# Patient Record
Sex: FEMALE | Race: WHITE
[De-identification: names, ages, dates, MRNs, and addresses within clinical notes are randomized per-mention and may not be internally consistent; named-entity substitution may affect disease eponyms.]

---

## 2019-06-27 ENCOUNTER — HOSPITAL ENCOUNTER (EMERGENCY)
Dept: HOSPITAL 63 - ER | Age: 44
Discharge: HOME | End: 2019-06-27
Payer: COMMERCIAL

## 2019-06-27 VITALS — SYSTOLIC BLOOD PRESSURE: 154 MMHG | DIASTOLIC BLOOD PRESSURE: 91 MMHG

## 2019-06-27 VITALS — HEIGHT: 65 IN | BODY MASS INDEX: 38.32 KG/M2 | WEIGHT: 230 LBS
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
## 2019-06-27 DIAGNOSIS — Z88.5: ICD-10-CM

## 2019-06-27 DIAGNOSIS — S63.655A: Primary | ICD-10-CM

## 2019-06-27 PROCEDURE — 99284 EMERGENCY DEPT VISIT MOD MDM: CPT

## 2019-06-27 PROCEDURE — 73130 X-RAY EXAM OF HAND: CPT

## 2019-06-27 NOTE — PHYS DOC
Past History


Past Medical History:  No Pertinent History


Past Surgical History:  Cholecystectomy, Other


Alcohol Use:  None


Drug Use:  None





Adult General


Chief Complaint


Chief Complaint:  HAND PROBLEM





HPI


HPI


44-year-old female presents with sudden onset left hand pain. The patient works 

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 She uses her hands all day long. She would've some 

folds and moves things 10 hours today. Yesterday, the patient was pulling on a 

product when she heard a pop in her left hand. This was followed by pain at the 

base of the left fourth digit. She was able to tolerate the pain and keep 

working. Today, the patient states the pain feels worse and she is unable to 

fully close her hand due to discomfort. The pain is centered at the base of the 

fourth digit and radiates up to the distal interphalangeal joint. Her some 

discomfort at the base of the third digit, but much less intense. The patient 

took 2 Aleve this morning. She denies any other injuries. She has no previous 

history of injuries to this hand. She denies fever or chills.





Review of Systems


Review of Systems





Constitutional: Denies fever or chills []


Eyes: Denies change in visual acuity, redness, or eye pain []


HENT: Denies nasal congestion or sore throat []


Respiratory: Denies cough or shortness of breath []


Cardiovascular: No additional information not addressed in HPI []


GI: Denies abdominal pain, nausea, vomiting, bloody stools or diarrhea []


: Denies dysuria or hematuria []


Musculoskeletal: Left hand pain[]


Integument: Denies rash or skin lesions []


Neurologic: Denies headache, focal weakness or sensory changes []


Endocrine: Denies polyuria or polydipsia []





All other systems were reviewed and found to be within normal limits, except as 

documented in this note.





Allergies


Allergies





Allergies








Coded Allergies Type Severity Reaction Last Updated Verified


 


  codeine Allergy Unknown  6/27/19 Yes











Physical Exam


Physical Exam





Constitutional: Well developed, well nourished, no acute distress, non-toxic 

appearance. []


HENT: Normocephalic, atraumatic, bilateral external ears normal, oropharynx 

moist, no oral exudates, nose normal. []


Eyes: PERRLA, EOMI, conjunctiva normal, no discharge. [] 


Neck: Normal range of motion, no tenderness, supple, no stridor. [] 


Cardiovascular:Heart rate regular rhythm, no murmur []


Lungs & Thorax:  Bilateral breath sounds clear to auscultation []


Abdomen: Bowel sounds normal, soft, no tenderness, no masses, no pulsatile 

masses. [] 


Skin: Warm, dry, no erythema, no rash. [] 


Back: No tenderness, no CVA tenderness. [] 


Extremities: Neurovascular and tendons of the left hand are intact. There is 

pain with flexion of the left digit. Pain with palpation of the 

metacarpophalangeal joint of the fourth digit, right hand. No significant 

swelling or obvious deformity.[] 


Neurologic: Alert and oriented X 3, normal motor function, normal sensory 

function, no focal deficits noted. []


Psychologic: Affect normal, judgement normal, mood normal. []





Current Patient Data


Vital Signs





                                   Vital Signs








  Date Time  Temp Pulse Resp B/P (MAP) Pulse Ox O2 Delivery O2 Flow Rate FiO2


 


6/27/19 08:59 98.9 104 18  99 Room Air  











EKG


EKG


[]





Radiology/Procedures


Radiology/Procedures


[]


Impressions:


Examination: HAND LEFT 3V


 


History: Medial hand pain and swelling.


 


Comparison/Correlation: None


 


Findings: Total 3 images of the left hand were obtained. Joint spaces are 


normal. No acute fracture or bony destruction. Soft tissues are 


unremarkable. No degenerative change.


 


 


Impression:


No acute process.


 


Electronically signed by: Becky Lemus MD (6/27/2019 9:48 AM) [id number]














DICTATED AND SIGNED BY:     BECKY LEMUS MD


DATE:     06/27/19 0948





CC: NANCY TEMPLETON DO; REYMUNDO COVARRUBIAS MD ~





Course & Med Decision Making


Course & Med Decision Making


Pertinent Labs and Imaging studies reviewed. (See chart for details)


The patient's hand x-rays negative for fracture. The patient has a hand sprain. 

I have advised anti-inflammatory, ice, and rest. I will give the patient a 

couple days off. She will follow up with the Worker's Comp. physician for 

further follow-up. She is stable for discharge at this time.


[]





Dragon Disclaimer


Dragon Disclaimer


This electronic medical record was generated, in whole or in part, using a voice

 recognition dictation system.





Departure


Departure:


Impression:  


   Primary Impression:  


   Sprain, MCP, hand, left


Disposition:  01 HOME, SELF-CARE


Condition:  STABLE


Referrals:  


REYMUNDO COVARRUBIAS MD (PCP)


Patient Instructions:  Finger Sprain, Easy-to-Read





Problem Qualifiers








   Primary Impression:  


   Sprain, MCP, hand, left


   Encounter type:  initial encounter  Finger:  ring finger  Qualified Codes:  

   S63.655A - Sprain of metacarpophalangeal joint of left ring finger, initial 

   encounter








NANCY TEMPLETON DO                 Jun 27, 2019 09:26

## 2019-06-27 NOTE — RAD
Examination: HAND LEFT 3V

 

History: Medial hand pain and swelling.

 

Comparison/Correlation: None

 

Findings: Total 3 images of the left hand were obtained. Joint spaces are 

normal. No acute fracture or bony destruction. Soft tissues are 

unremarkable. No degenerative change.

 

 

Impression:

No acute process.

 

Electronically signed by: Mauri Traylor MD (6/27/2019 9:48 AM) [id number]

## 2020-06-11 ENCOUNTER — HOSPITAL ENCOUNTER (EMERGENCY)
Dept: HOSPITAL 63 - ER | Age: 45
Discharge: ANOTHER HOSPITAL | End: 2020-06-11
Payer: SELF-PAY

## 2020-06-11 ENCOUNTER — HOSPITAL ENCOUNTER (INPATIENT)
Dept: HOSPITAL 61 - 2 SOUTH | Age: 45
LOS: 1 days | Discharge: HOME | DRG: 309 | End: 2020-06-12
Attending: INTERNAL MEDICINE | Admitting: INTERNAL MEDICINE
Payer: SELF-PAY

## 2020-06-11 VITALS — BODY MASS INDEX: 42.94 KG/M2 | WEIGHT: 257.72 LBS | HEIGHT: 65 IN

## 2020-06-11 VITALS — HEIGHT: 66 IN | BODY MASS INDEX: 35.43 KG/M2 | WEIGHT: 220.46 LBS

## 2020-06-11 VITALS — SYSTOLIC BLOOD PRESSURE: 107 MMHG | DIASTOLIC BLOOD PRESSURE: 48 MMHG

## 2020-06-11 DIAGNOSIS — Z79.899: ICD-10-CM

## 2020-06-11 DIAGNOSIS — I10: ICD-10-CM

## 2020-06-11 DIAGNOSIS — Z71.6: ICD-10-CM

## 2020-06-11 DIAGNOSIS — Z98.891: ICD-10-CM

## 2020-06-11 DIAGNOSIS — E66.01: ICD-10-CM

## 2020-06-11 DIAGNOSIS — Z71.3: ICD-10-CM

## 2020-06-11 DIAGNOSIS — I48.91: ICD-10-CM

## 2020-06-11 DIAGNOSIS — Z71.41: ICD-10-CM

## 2020-06-11 DIAGNOSIS — I47.1: Primary | ICD-10-CM

## 2020-06-11 DIAGNOSIS — Z88.5: ICD-10-CM

## 2020-06-11 DIAGNOSIS — Z82.49: ICD-10-CM

## 2020-06-11 DIAGNOSIS — Z72.89: ICD-10-CM

## 2020-06-11 DIAGNOSIS — Z20.828: ICD-10-CM

## 2020-06-11 DIAGNOSIS — F17.210: ICD-10-CM

## 2020-06-11 DIAGNOSIS — I45.6: ICD-10-CM

## 2020-06-11 LAB
ALBUMIN SERPL-MCNC: 3.8 G/DL (ref 3.4–5)
ALBUMIN/GLOB SERPL: 1 {RATIO} (ref 1–1.7)
ALP SERPL-CCNC: 94 U/L (ref 46–116)
ALT SERPL-CCNC: 53 U/L (ref 14–59)
AMPHETAMINE/METHAMPHETAMINE: (no result)
ANION GAP SERPL CALC-SCNC: 8 MMOL/L (ref 6–14)
APTT PPP: YELLOW S
AST SERPL-CCNC: 45 U/L (ref 15–37)
BACTERIA #/AREA URNS HPF: (no result) /HPF
BARBITURATES UR-MCNC: (no result) UG/ML
BASOPHILS # BLD AUTO: 0.1 X10^3/UL (ref 0–0.2)
BASOPHILS NFR BLD: 1 % (ref 0–3)
BENZODIAZ UR-MCNC: (no result) UG/L
BILIRUB SERPL-MCNC: 0.2 MG/DL (ref 0.2–1)
BILIRUB UR QL STRIP: (no result)
BUN/CREAT SERPL: 9 (ref 6–20)
CA-I SERPL ISE-MCNC: 6 MG/DL (ref 7–20)
CALCIUM SERPL-MCNC: 9 MG/DL (ref 8.5–10.1)
CANNABINOIDS UR-MCNC: (no result) UG/L
CHLORIDE SERPL-SCNC: 107 MMOL/L (ref 98–107)
CO2 SERPL-SCNC: 31 MMOL/L (ref 21–32)
COCAINE UR-MCNC: (no result) NG/ML
CREAT SERPL-MCNC: 0.7 MG/DL (ref 0.6–1)
EOSINOPHIL NFR BLD: 0.3 X10^3/UL (ref 0–0.7)
EOSINOPHIL NFR BLD: 3 % (ref 0–3)
ERYTHROCYTE [DISTWIDTH] IN BLOOD BY AUTOMATED COUNT: 14.6 % (ref 11.5–14.5)
FIBRINOGEN PPP-MCNC: CLEAR MG/DL
GFR SERPLBLD BASED ON 1.73 SQ M-ARVRAT: 91.3 ML/MIN
GLOBULIN SER-MCNC: 3.8 G/DL (ref 2.2–3.8)
GLUCOSE SERPL-MCNC: 112 MG/DL (ref 70–99)
GLUCOSE UR STRIP-MCNC: (no result) MG/DL
HCT VFR BLD CALC: 45.6 % (ref 36–47)
HGB BLD-MCNC: 14.8 G/DL (ref 12–15.5)
LYMPHOCYTES # BLD: 3.6 X10^3/UL (ref 1–4.8)
LYMPHOCYTES NFR BLD AUTO: 35 % (ref 24–48)
MCH RBC QN AUTO: 30 PG (ref 25–35)
MCHC RBC AUTO-ENTMCNC: 33 G/DL (ref 31–37)
MCV RBC AUTO: 94 FL (ref 79–100)
METHADONE SERPL-MCNC: (no result) NG/ML
MONO #: 0.8 X10^3/UL (ref 0–1.1)
MONOCYTES NFR BLD: 8 % (ref 0–9)
NEUT #: 5.5 X10^3UL (ref 1.8–7.7)
NEUTROPHILS NFR BLD AUTO: 54 % (ref 31–73)
NITRITE UR QL STRIP: (no result)
OPIATES UR-MCNC: (no result) NG/ML
PCP SERPL-MCNC: (no result) MG/DL
PLATELET # BLD AUTO: 283 X10^3/UL (ref 140–400)
POTASSIUM SERPL-SCNC: 3.9 MMOL/L (ref 3.5–5.1)
PROT SERPL-MCNC: 7.6 G/DL (ref 6.4–8.2)
RBC # BLD AUTO: 4.87 X10^6/UL (ref 3.5–5.4)
RBC #/AREA URNS HPF: (no result) /HPF (ref 0–2)
SODIUM SERPL-SCNC: 146 MMOL/L (ref 136–145)
SP GR UR STRIP: <=1.005
SQUAMOUS #/AREA URNS LPF: (no result) /LPF
UROBILINOGEN UR-MCNC: 0.2 MG/DL
WBC # BLD AUTO: 10.3 X10^3/UL (ref 4–11)
WBC #/AREA URNS HPF: (no result) /HPF (ref 0–4)

## 2020-06-11 PROCEDURE — 84484 ASSAY OF TROPONIN QUANT: CPT

## 2020-06-11 PROCEDURE — 85025 COMPLETE CBC W/AUTO DIFF WBC: CPT

## 2020-06-11 PROCEDURE — 87086 URINE CULTURE/COLONY COUNT: CPT

## 2020-06-11 PROCEDURE — 96375 TX/PRO/DX INJ NEW DRUG ADDON: CPT

## 2020-06-11 PROCEDURE — 71045 X-RAY EXAM CHEST 1 VIEW: CPT

## 2020-06-11 PROCEDURE — 96365 THER/PROPH/DIAG IV INF INIT: CPT

## 2020-06-11 PROCEDURE — 81001 URINALYSIS AUTO W/SCOPE: CPT

## 2020-06-11 PROCEDURE — 93306 TTE W/DOPPLER COMPLETE: CPT

## 2020-06-11 PROCEDURE — 80053 COMPREHEN METABOLIC PANEL: CPT

## 2020-06-11 PROCEDURE — 36415 COLL VENOUS BLD VENIPUNCTURE: CPT

## 2020-06-11 PROCEDURE — 80307 DRUG TEST PRSMV CHEM ANLYZR: CPT

## 2020-06-11 PROCEDURE — 96366 THER/PROPH/DIAG IV INF ADDON: CPT

## 2020-06-11 PROCEDURE — 99285 EMERGENCY DEPT VISIT HI MDM: CPT

## 2020-06-11 PROCEDURE — 93005 ELECTROCARDIOGRAM TRACING: CPT

## 2020-06-11 PROCEDURE — G0378 HOSPITAL OBSERVATION PER HR: HCPCS

## 2020-06-11 PROCEDURE — 96376 TX/PRO/DX INJ SAME DRUG ADON: CPT

## 2020-06-11 RX ADMIN — ADENOSINE ONE MG: 3 INJECTION, SOLUTION INTRAVENOUS at 20:45

## 2020-06-11 RX ADMIN — ADENOSINE ONE MG: 3 INJECTION, SOLUTION INTRAVENOUS at 20:43

## 2020-06-11 NOTE — RAD
Study: CR CHEST AP ONLY

 

Indication: Shortness of air. Tachycardia.

 

Comparison: None.

 

Findings:

 

Low lung volumes with bronchovascular crowding. The cardiomediastinal 

silhouette and alberta are within normal limits for size given low lung 

volumes and AP technique. No lobar consolidation, pleural effusion or 

pneumothorax.

 

Impression:

 

No acute radiographic abnormality of the chest. Low lung volumes.

 

Electronically signed by: SANJAY HUERTAS MD (6/11/2020 8:57 PM) [id number]

## 2020-06-11 NOTE — PHYS DOC
Past History


Past Medical History:  No Pertinent History


Past Medical History


WPW


Past Surgical History:  Cholecystectomy, Other


Smoking:  Non-smoker


Alcohol Use:  None


Drug Use:  None





General Adult


EDM:


Chief Complaint:  RAPID HEART RATE





HPI:


HPI:





Patient is a 45 year old female who presents for evaluation of a rapid heart 

rate that started about 1 1/2 hours ago.  Patient has a history of prior supra 

ventricular tachycardia as well as Darci-Parkinson-White syndrome.  Patient has 

had a prior cardiac ablation in the past.  Patient is normally seen at [facility] for Cardiology.  Patient has anxiety, sweats and shortness of air on 

arrival.  Patient states this feels very similar to prior episodes of SVT in the

past.  Patient is otherwise stable with no other complaints





Review of Systems:


Review of Systems:


Constitutional:  Denies fever or chills 


Eyes:  Denies change in visual acuity 


HENT:  Denies nasal congestion or sore throat 


Respiratory:  Denies cough has shortness of breath 


Cardiovascular:  Has chest pain/pressure, no edema, has palpitations


GI:  Denies abdominal pain, nausea, vomiting, bloody stools or diarrhea 


: Denies dysuria 


Musculoskeletal:  Denies back pain or joint pain 


Integument:  Denies rash 


Neurologic:  Denies headache, focal weakness or sensory changes 


Endocrine:  Denies polyuria or polydipsia 


Lymphatic:  Denies swollen glands 


Psychiatric:  Denies depression is moderately anxiety





Heart Score:


Risk Factors:


Risk Factors:  DM, Current or recent (<one month) smoker, HTN, HLP, family 

history of CAD, obesity.


Risk Scores:


Score 0 - 3:  2.5% MACE over next 6 weeks - Discharge Home


Score 4 - 6:  20.3% MACE over next 6 weeks - Admit for Clinical Observation


Score 7 - 10:  72.7% MACE over next 6 weeks - Early Invasive Strategies





Allergies:


Allergies:





Allergies








Coded Allergies Type Severity Reaction Last Updated Verified


 


  codeine Allergy Unknown  19 Yes











Physical Exam:


PE:





Constitutional: Well developed, well nourished, moderate distress, non-toxic 

appearance. []


HENT: Normocephalic, atraumatic, bilateral external ears normal, oropharynx 

moist, no oral exudates, nose normal. []


Eyes: PERRL, EOMI, conjunctiva normal, no discharge. [] 


Neck: Normal range of motion, no tenderness, supple, no stridor. [] 


Cardiovascular: tachy heart rate, regular rhythm, no murmur []


Lungs & Thorax:  Bilateral breath sounds clear to auscultation []


Abdomen: Bowel sounds normal, soft, no tenderness, no masses, no pulsatile 

masses. [] 


Skin: Warm, dry, no erythema, no rash. [] 


Back: No tenderness. [] 


Extremities: No tenderness, no cyanosis, no clubbing, ROM intact, no edema. [] 


Neurologic: Alert and oriented X 3, normal motor function, normal sensory 

function, no focal deficits noted. []


Psychologic: Affect normal, judgement normal, moderately anxious []





Current Patient Data:


Labs:





Laboratory Tests








Test


 20


20:30


 


White Blood Count 10.3 x10^3/uL 


 


Red Blood Count 4.87 x10^6/uL 


 


Hemoglobin 14.8 g/dL 


 


Hematocrit 45.6 % 


 


Mean Corpuscular Volume 94 fL 


 


Mean Corpuscular Hemoglobin 30 pg 


 


Mean Corpuscular Hemoglobin


Concent 33 g/dL 





 


Red Cell Distribution Width 14.6 % 


 


Platelet Count 283 x10^3/uL 


 


Neutrophils (%) (Auto) 54 % 


 


Lymphocytes (%) (Auto) 35 % 


 


Monocytes (%) (Auto) 8 % 


 


Eosinophils (%) (Auto) 3 % 


 


Basophils (%) (Auto) 1 % 


 


Neutrophils # (Auto) 5.5 x10^3uL 


 


Lymphocytes # (Auto) 3.6 x10^3/uL 


 


Monocytes # (Auto) 0.8 x10^3/uL 


 


Eosinophils # (Auto) 0.3 x10^3/uL 


 


Basophils # (Auto) 0.1 x10^3/uL 


 


Sodium Level 146 mmol/L 


 


Potassium Level 3.9 mmol/L 


 


Chloride Level 107 mmol/L 


 


Carbon Dioxide Level 31 mmol/L 


 


Anion Gap 8 


 


Blood Urea Nitrogen 6 mg/dL 


 


Creatinine 0.7 mg/dL 


 


Estimated GFR


(Cockcroft-Gault) 91.3 





 


BUN/Creatinine Ratio 9 


 


Glucose Level 112 mg/dL 


 


Calcium Level 9.0 mg/dL 


 


Total Bilirubin 0.2 mg/dL 


 


Aspartate Amino Transf


(AST/SGOT) 45 U/L 





 


Alanine Aminotransferase


(ALT/SGPT) 53 U/L 





 


Alkaline Phosphatase 94 U/L 


 


Troponin I Quantitative < 0.017 ng/mL 


 


Total Protein 7.6 g/dL 


 


Albumin 3.8 g/dL 


 


Albumin/Globulin Ratio 1.0 








Current Medications








 Medications


  (Trade)  Dose


 Ordered  Sig/Willian


 Route


 PRN Reason  Start Time


 Stop Time Status Last Admin


Dose Admin


 


 Sodium Chloride  1,000 ml @ 


 1,000 mls/hr  1X  ONCE


 IV


   20 20:30


 20 21:29   





 


 Adenosine


  (Adenocard)  6 mg  1X  ONCE


 IV


   20 20:30


 20 20:41 DC  





 


 Diltiazem HCl 125


 mg/Sodium Chloride  125 ml @ 5


 mls/hr  CONT  PRN


 IV


 SEE I/O RECORD  20 21:00


   UNV  














EKG:


EKG:


EKG read at  1 PM showed SVT rate 160s otherwise no other obvious acute 

changes, incomplete bundle branch block, abnormal EKG []





Radiology/Procedures:


Radiology/Procedures:


[facility] [address], [place] [address]


                                 [phone]


                                        


                                 IMAGING REPORT





                                     Signed





PATIENT: ALEXANDRE GUERRA ACCOUNT: [id number]     MRN#: [id number]


: 1975           LOCATION: ER              AGE: 45


SEX: F                    EXAM DT: 20         ACCESSION#: [id number]


STATUS: REG ER            ORD. PHYSICIAN: PAULA POTTER DO


REASON: short of air, tachycardia


PROCEDURE: CHEST AP ONLY





Study: CR CHEST AP ONLY


 


Indication: Shortness of air. Tachycardia.


 


Comparison: None.


 


Findings:


 


Low lung volumes with bronchovascular crowding. The cardiomediastinal 


silhouette and alberta are within normal limits for size given low lung 


volumes and AP technique. No lobar consolidation, pleural effusion or 


pneumothorax.


 


Impression:


 


No acute radiographic abnormality of the chest. Low lung volumes.


 


Electronically signed by: SANJAY HUERTAS MD (2020 8:57 PM) [id number]














DICTATED AND SIGNED BY:     SANJAY HUERTAS MD


DATE:     20





CC: PCP,NO; PAULA POTTER DO ~


[]





Course & Med Decision Making:


Course & Med Decision Making


Pertinent Labs and Imaging studies reviewed. (See chart for details)





[]





Dragon Disclaimer:


Dragon Disclaimer:


This electronic medical record was generated, in whole or in part, using a voice

 recognition dictation system.





 dose of adenosine 6 mg briefly converted heart to a sinus rhythm but it did

 not stay more than a minute.  Patient was then given adenosine 12 mg IV push 

again the rhythm changed for less than 1 minute then converted back to SVT.  

Repeat EKG being ordered at this time





  Case was discussed with Dr. Atkinson for Cardiology.  He requested patient 

be placed on a Cardizem drip.  Furthermore he would like patient to be 

transferred to [facility] where he would check on patient later 

tonight.  In the meantime we will call the on-call hospitalist service for 

acceptance and to get a bed.  Patient highly anxious so dose of Ativan ordered





 Dr. Caballero called back and accepted patient for admission to [facility].  He will get cardiology consult.





Departure


Departure:


Impression:  


   Primary Impression:  


   SVT (supraventricular tachycardia)


   Additional Impression:  


   History of Darci-Parkinson-White (WPW) syndrome


Disposition:   TRANSFER OTHER


Admitting Physician:  Other (Dr. Caballero)


Condition:  STABLE


Referrals:  


PCP,NO (PCP)





Justification of Admission:


Justification of Admission:


Justification of Admission Dx:  Yes


CHF:  Cardiac Arrhythmias





Critical Care Time


 Critical care time was 30 minutes exclusive of procedures.  This included 

talking to admitting hospitalist and cardiologist as well as reviewing labs and 

x-rays





Additional Procedures


Additional Procedures :  


   Additional Procedures:  cardioversion/defib


Progress


With consent patient, 3 attempts at cardioversion with adenosine used.  This 

included 6 mg dose followed by 12 mg and then a repeat 12 mg dose over about 20 

minutes.  After each dose there was a brief conversion to a sinus rhythm but 

patient converted back to SVT less than 1 minute later each dose.  Blood 

pressure remained stable.  Patient remained lucid and appropriate.  Repeat EKG 

at  showed the patient had once again resumed supraventricular tachycardia.











PAULA POTTER DO              2020 20:27

## 2020-06-11 NOTE — EKG
[facility] [address], [place] [address]

Test Date:    2020               Test Time:    20:18:06

Pat Name:     ALEXANDRE GUERRA           Department:   

Patient ID:   [id number]           Room:          

Gender:       F                        Technician:   

:          1975               Requested By: PAULA POTTER

Order Number: [id number]            Reading MD:   Morro Sebastian

                                 Measurements

Intervals                              Axis          

Rate:         166                      P:            

IL:                                    QRS:          18

QRSD:         86                       T:            -68

QT:           282                                    

QTc:          470                                    

                           Interpretive Statements

SUPRAVENTRICULAR TACHYCARDIA

NONSPECIFIC ST-T WAVE CHANGES.



Electronically Signed On 2020 16:45:29 CDT by Morro Sebastian

## 2020-06-12 VITALS — SYSTOLIC BLOOD PRESSURE: 113 MMHG | DIASTOLIC BLOOD PRESSURE: 64 MMHG

## 2020-06-12 VITALS — DIASTOLIC BLOOD PRESSURE: 54 MMHG | SYSTOLIC BLOOD PRESSURE: 93 MMHG

## 2020-06-12 VITALS — SYSTOLIC BLOOD PRESSURE: 113 MMHG | DIASTOLIC BLOOD PRESSURE: 61 MMHG

## 2020-06-12 VITALS — SYSTOLIC BLOOD PRESSURE: 98 MMHG | DIASTOLIC BLOOD PRESSURE: 70 MMHG

## 2020-06-12 VITALS — SYSTOLIC BLOOD PRESSURE: 102 MMHG | DIASTOLIC BLOOD PRESSURE: 59 MMHG

## 2020-06-12 VITALS — DIASTOLIC BLOOD PRESSURE: 60 MMHG | SYSTOLIC BLOOD PRESSURE: 103 MMHG

## 2020-06-12 VITALS — SYSTOLIC BLOOD PRESSURE: 99 MMHG | DIASTOLIC BLOOD PRESSURE: 65 MMHG

## 2020-06-12 VITALS — SYSTOLIC BLOOD PRESSURE: 99 MMHG | DIASTOLIC BLOOD PRESSURE: 57 MMHG

## 2020-06-12 VITALS — SYSTOLIC BLOOD PRESSURE: 89 MMHG | DIASTOLIC BLOOD PRESSURE: 55 MMHG

## 2020-06-12 VITALS — DIASTOLIC BLOOD PRESSURE: 56 MMHG | SYSTOLIC BLOOD PRESSURE: 103 MMHG

## 2020-06-12 VITALS — DIASTOLIC BLOOD PRESSURE: 55 MMHG | SYSTOLIC BLOOD PRESSURE: 107 MMHG

## 2020-06-12 VITALS — DIASTOLIC BLOOD PRESSURE: 60 MMHG | SYSTOLIC BLOOD PRESSURE: 108 MMHG

## 2020-06-12 NOTE — PDOC3
Discharge Summary


Visit Information


Date of Admission:  Jun 11, 2020


Date of Discharge:  Jun 12, 2020


Admitting Diagnosis:  Afib with RVR


Final Diagnosis


Afib with RVR





Brief Hospital Course


Allergies





                                    Allergies








Coded Allergies Type Severity Reaction Last Updated Verified


 


  codeine Allergy Intermediate Rash 4/9/15 Yes








Vital Signs





Vital Signs








  Date Time  Temp Pulse Resp B/P (MAP) Pulse Ox O2 Delivery O2 Flow Rate FiO2


 


6/12/20 14:19 98.1 78 16 107/55 (72) 96 Room Air  





 98.1       








Brief Hospital Course


Ms Patel is a 46 yo F w/ PMHx smoker, WPW s/p ablation (6yrs ago) who 

presents to [facility] on 6/11/2020 for palpitations that started about 90 minutes

prior to ED arrival.  Patient has a history of prior supra ventricular 

tachycardia as well as Darci-Parkinson-White syndrome.  Patient has had a prior 

cardiac ablation in the past at [facility] and used to follow up there for 

Cardiology.


EKG showed SVT rate 160s otherwise no other obvious acute changes, incomplete 

bundle branch block, abnormal EKG. CXR no acute abnormalities


She was given a dose of adenosine 6 mg briefly converted heart to a sinus rhythm

but it did not stay more than a minute.  Patient was then given adenosine 12 mg 

IV push again the rhythm changed for less than 1 minute then converted back to 

SVT.


Cardiology was contacted, and they recommended to start a diltiazem infusion.  

She was transferred to [facility] in stable condition and converted to sinus 

rhythm around 0200.


WBC 10.3, Hb 14.8, platelets 283, Na 146, K 3.9, BUN 6, Cr 0.7, AST 45, ALT 53, 

Trop <0.017





Seen bedside [facility] she continues on diltiazem infusion.  She

still admits her history by saying that she has been tried on metoprolol 25 mg 

twice daily in the past and had minimal success and had continued SVT despite 

that and approximately 6 years ago she had an ablation at [facility] that was

successful and has not followed up for over a year.  She continues to smoke and 

drink, however she avoids other stimulants such as caffeine and cheese.  She 

notes that for the past 3 days she is actually felt badly and has a lot of 

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  She 

has never been tried on flecainide or Rythmol.





Patient is otherwise stable with no other complaints





Seen and given flecainide x1 dose, maintained NSR for 24 hours. Changed to 

metoprolol after cardiology consultation and continued on celexa. She will have 

outpatient cardiology f/u. Her uninsured status makes this difficult.





SVT (supraventricular tachycardia) -not surprising adenosine did not work, it is

good that calcium channel blocker worked.  Given her history of metoprolol gave 

1 dose of flecainide and will d/c on  mg, cardiology f/u, metoprolol 25mg

BID


History of Darci-Parkinson-White (WPW) syndrome -status post ablation. Ca

rdiology consulted outpatient


Smoker - counseled on cessation


ETOH use - counseled on cessation


Morbid obesitycounseled on weight loss





Greater than 135 minutes spent on same day admit and d/c





Discharge Information


Condition at Discharge:  Improved


Follow Up:  Weeks (1)


Disposition/Orders:  D/C to Home


Scheduled


Citalopram Hydrobromide (Celexa) 20 Mg Tablet, 1 TAB PO DAILY for MDD for 90 

Days, #90 Ref 3


   Prescribed by: CARLENE MAXWELL MD on 6/12/20 1651


Metoprolol Tartrate (Metoprolol Tartrate) 25 Mg Tablet, 1 TAB PO BID for 

Afib/WPW for 90 Days, #180 Ref 3


   Prescribed by: CARLENE MAXWELL MD on 6/12/20 1651


Omeprazole (Omeprazole) 20 Mg Capsule.dr, 1 CAP PO DAILY for   , #30 Ref 5 

(Reported)


   Entered as Reported by: Torie Dennison on 6/12/20 0023


   Last Action: Converted on 6/12/20 0141 by DEEPTI TIRADO RN





Justicifation of Admission Dx:


Justifications for Admission:


Justification of Admission Dx:  Yes











CARLENE MAXWELL MD        Jun 12, 2020 16:55

## 2020-06-12 NOTE — NUR
patient arrived to unit at approx 2350 on 6/11/20 accompanied by EMS. Pt transfer from [facility]. HR was 140's when patient arrived, assessment complete. Cardizem Gtt running at 15 
ml/hr. no complaints of pain at this time, pt is resting comfortably on RA. Orientated 
patient to unit, reminded to call before ambulating. Call light in reach, bed in low locked 
position. Will continue to monitor.

## 2020-06-12 NOTE — NUR
SS following for discharge planning. SS reviewed pt chart and discussed with pt RN. Pt is 
from home and is currently on room air. Currently awaiting cardiology to see. Possible 
discharge to home today. SS will continue to follow for discharge planning.

## 2020-06-12 NOTE — NUR
Discharge: Teaching verbal and written. Reviewed medication, follow up, SVT, ECHO, smoking 
cessation, ect. Patient verbalized understanding. Prescription for metoprolol sent to 
pharmacy. All belongings with patient. Patient assisted off of unit. Cab pass provided.

## 2020-06-12 NOTE — EKG
[facility]

              [address] [place] [address]

Test Date:    2020               Test Time:    16:17:46

Pat Name:     ALEXANDRE GUERRA           Department:   

Patient ID:   [id number]           Room:         252 1

Gender:       F                        Technician:   MARKEL

:          1975               Requested By: TONY MATHUR

Order Number: [id number]           Reading MD:   Tony Mathur MD

                                 Measurements

Intervals                              Axis          

Rate:         76                       P:            30

FL:           186                      QRS:          -1

QRSD:         96                       T:            -2

QT:           378                                    

QTc:          430                                    

                           Interpretive Statements

SINUS RHYTHM



Electronically Signed On 6- 13:16:25 CDT by Tony Mahtur MD

## 2020-06-12 NOTE — CARD
MR#: [id number]

Account#: [id number]

Accession#: [id number]

Date of Study: 06/12/2020

Ordering Physician: CARLENE MAXWELL, 

Referring Physician: CARLENE MAXWELL, 

Tech: Tanvi Armstrong





--------------- APPROVED REPORT --------------





EXAM: Two-dimensional and M-mode echocardiogram with Doppler and color Doppler.



Other Information 

Quality : AverageHR: 78bpm

Technically limited study due to  body habitus.



INDICATION

Atrial Fibrillation



2D DIMENSIONS 

RVDd2.4 (2.9-3.5cm)Left Atrium(2D)3.4 (1.6-4.0cm)

IVSd0.8 (0.7-1.1cm)Aortic Root(2D)2.6 (2.0-3.7cm)

LVDd4.7 (3.9-5.9cm)LVOT Diameter2.1 (1.8-2.4cm)

PWd1.0 (0.7-1.1cm)LVDs3.1 (2.5-4.0cm)

FS (%) 34.6 %SV64.7 ml

LVEF(%)63.7 (>50%)



Aortic Valve

AoV Peak Ketan.125.0cm/sAoV VTI24.8cm

AO Peak GR.6.3mmHgLVOT  VTI 20.38cm

AO Mean GR.4mmHg



Mitral Valve

MV E Yymlchxb62.3cm/sMV E Peak Gr.4mmHg

MV DECEL [id number] A Grpyzlga11.5cm/s

MV E Mean Gr.2mmHgE/A  Ratio1.5



TDI

Lateral E' P. V8.56cm/sMedial E' P. V11.71cm/s

E/Lateral E'9.6E/Medial E'7.0



Pulmonary Vein

S1 Ohjdjjul95.8cm/sS2 Sdqkfmhk44.87cm/s

D2 Ngknrvfq66.9cm/sPVa ukqraion995zvkk



 LEFT VENTRICLE 

The left ventricle is normal size. There is borderline concentric left ventricular hypertrophy. The l
eft ventricular systolic function is normal and the ejection fraction is within normal range. The Eje
ction Fraction is 50-55%. There is normal LV segmental wall motion. The left ventricular diastolic fu
nction and filling is normal for age.



 RIGHT VENTRICLE 

The right ventricle is normal size. There is normal right ventricular wall thickness. The right ventr
icular systolic function is normal.



 ATRIA 

The left atrium size is normal. The right atrium size is normal. The interatrial septum is intact wit
h no evidence for an atrial septal defect or patent foramen ovale as noted on 2-D or Doppler imaging.




 AORTIC VALVE 

The aortic valve is normal in structure and function. Doppler and Color Flow revealed no significant 
aortic regurgitation. There is no significant aortic valvular stenosis.



 MITRAL VALVE 

The mitral valve is normal in structure and function. There is no evidence of mitral valve prolapse. 
There is no mitral valve stenosis. Doppler and Color-flow revealed trace mitral regurgitation.



 TRICUSPID VALVE 

The tricuspid valve is normal in structure and function. Doppler and Color Flow revealed no tricuspid
 valve regurgitation noted. There is no tricuspid valve stenosis.



 PULMONIC VALVE 

The pulmonic valve is not well visualized. Doppler and Color Flow revealed no pulmonic valvular regur
gitation. There is no pulmonic valvular stenosis.



 GREAT VESSELS 

The aortic root is normal in size. The IVC is normal in size and collapses >50% with inspiration.



 PERICARDIAL EFFUSION 

There is no evidence of significant pericardial effusion.



Critical Notification

Critical Value: No



<Conclusion>

The left ventricular systolic function is normal and the ejection fraction is within normal range. Th
e Ejection Fraction is 50-55%.

There is normal LV segmental wall motion.



Signed by : Wilbert Kitchen, 

Electronically Approved : 06/12/2020 14:27:43

## 2020-06-12 NOTE — CONS
DATE OF CONSULTATION:  06/12/2020



REASON FOR CONSULTATION:  Arrhythmia.



CONSULTING PHYSICIAN:  Dr. Gold.



HISTORY OF PRESENT ILLNESS:  The patient is a very pleasant 45-year-old woman

who presented to the hospital in the setting of SVT.  She had been in her usual

state of health, and approximately a month or so ago, she had an episode of SVT,

which prompted evaluation at [facility] and her SVT resolved

with adenosine and she was discharged in stable condition.  She then had another

episode yesterday, which would not resolve with vagal maneuvers.  She has a

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 

She has not had followup with her cardiologist for approximately 6 years.  She

feels that triggers for her SVT include alcohol and stimulant use, which

occurred yesterday prior to her presentation.



PAST MEDICAL HISTORY:

1.  Obesity.

2.  SVT, status post ablation.



SOCIAL HISTORY:  The patient does not smoke.  She does drink alcohol and

occasionally uses energy drinks.



FAMILY HISTORY:  No sudden cardiac death.  No significant other family history

notable for cardiac pathology.



REVIEW OF SYSTEMS:  Negative for 10 out of 14 systems reviewed, unless otherwise

mentioned above in HPI.



ALLERGIES:  CODEINE.



CURRENT CARDIOVASCULAR MEDICATIONS:  None.



PHYSICAL EXAMINATION:

VITAL SIGNS:  Stable.

HEAD AND NECK:  Unremarkable.

CARDIAC:  Regular rate and rhythm without any murmurs, rubs or gallops.

LUNGS:  Clear to auscultation.

ABDOMEN:  Soft, nontender.

EXTREMITIES:  No clubbing, cyanosis or edema.

NEUROLOGIC:  No focal deficits.

MUSCULOSKELETAL:  No trauma.

SKIN:  No rashes.



DIAGNOSTIC STUDIES:  Hemoglobin, platelets, creatinine, myocardial enzymes are

within normal limits.



BNP is within normal limits.



Echocardiogram is unremarkable.



EKG demonstrates sinus rhythm without any acute ST or T-wave changes and no

evidence of accessory pathway.



Telemetry and EMS/ER, EKG was reviewed.  Overall, consistent with AVNRT.



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

stress, stimulant use and alcohol use.



RECOMMENDATIONS:  Start the patient on metoprolol 25 mg p.o. b.i.d. and she is

currently not on any rate control agents and then reassess on an outpatient

basis after cessation of heavy alcohol use and stimulant use.



Thank you for this consultation.  Please call with any further questions.

 



______________________________

TONY MATHUR MD



DR:  ESTRELLITA/delia  JOB#:  [id number] / [id number]

DD:  06/12/2020 23:34  DT:  06/12/2020 23:58

## 2021-11-26 ENCOUNTER — HOSPITAL ENCOUNTER (EMERGENCY)
Dept: HOSPITAL 63 - ER | Age: 46
Discharge: HOME | End: 2021-11-26
Payer: SELF-PAY

## 2021-11-26 VITALS — WEIGHT: 282.85 LBS | BODY MASS INDEX: 45.46 KG/M2 | HEIGHT: 66 IN

## 2021-11-26 VITALS — SYSTOLIC BLOOD PRESSURE: 136 MMHG | DIASTOLIC BLOOD PRESSURE: 80 MMHG

## 2021-11-26 DIAGNOSIS — Z88.5: ICD-10-CM

## 2021-11-26 DIAGNOSIS — J06.9: Primary | ICD-10-CM

## 2021-11-26 PROCEDURE — 99283 EMERGENCY DEPT VISIT LOW MDM: CPT

## 2021-11-26 PROCEDURE — 71045 X-RAY EXAM CHEST 1 VIEW: CPT

## 2021-11-26 NOTE — RAD
Study: XR CHEST 1V



Indication: Cough.



Comparison: 6/11/2020



Findings:



Unchanged cardiomediastinal silhouette and alberta. Similar aeration pattern of the lungs. No confluent 
infiltrate, pleural effusion or pneumothorax.



Impression:



No acute radiographic abnormality of the chest. No relevant change from the 6/11/2020 comparison. 



Electronically signed by: SANJAY HUERTAS MD (11/26/2021 7:43 AM) [id number]

## 2021-11-26 NOTE — PHYS DOC
Past History


Past Medical History:  Other


Additional Past Medical Histor:  wpw


Past Surgical History:  Cholecystectomy, Tubal ligation, Other


Additional Past Surgical Histo:  Ablasion


Smoking:  Non-smoker


Alcohol Use:  Occasionally


Drug Use:  None





General Adult


EDM:


Chief Complaint:  COUGH





HPI:


HPI:


46-year-old female presents with cough.  The patient has had a cough for about 2

weeks.  It is gotten worse the last couple of days.  She also has a hoarse voice

and sore throat.  She was seen by her primary provider and had a round of 

antibiotics and steroids previously.  This did not make her any better.  Today 

she has some chest wall pain due to coughing.  It is tender to palpation.  She 

denies any falls or trauma.  No fever at home.  She is vaccinated against COVID-

19 and is tested at work regularly.





Review of Systems:


Review of Systems:


Constitutional:  Denies fever or chills 


Eyes:  Denies change in visual acuity 


HENT:  Denies nasal congestion or sore throat 


Respiratory: Cough without shortness of breath 


Cardiovascular:  Denies chest pain or edema 


GI:  Denies abdominal pain, nausea, vomiting, bloody stools or diarrhea 


: Denies dysuria 


Musculoskeletal:  Denies back pain or joint pain 


Integument:  Denies rash 


Neurologic:  Denies headache, focal weakness or sensory changes 


Endocrine:  Denies polyuria or polydipsia 


Lymphatic:  Denies swollen glands 


Psychiatric:  Denies depression or anxiety





Allergies:


Allergies:





Allergies








Coded Allergies Type Severity Reaction Last Updated Verified


 


  codeine Allergy Unknown  6/27/19 Yes











Physical Exam:


PE:





Constitutional: Well developed, well nourished, morbidly obese, no acute 

distress, non-toxic appearance. []


HENT: Normocephalic, atraumatic, bilateral external ears normal, oropharynx 

moist, no oral exudates, nose normal. []


Eyes: PERRLA, EOMI, conjunctiva normal, no discharge. [] 


Neck: Normal range of motion, no tenderness, supple, no stridor. [] 


Cardiovascular: Heart rate regular rhythm, no murmur []


Lungs & Thorax:  Bilateral breath sounds clear to auscultation.  Mild left chest

 wall tenderness with palpation.  []


Abdomen: Bowel sounds normal, soft, no tenderness, no masses, no pulsatile 

masses. [] 


Skin: Warm, dry, no erythema, no rash. [] 


Back: No tenderness, no CVA tenderness. [] 


Extremities: No tenderness, no cyanosis, no clubbing, ROM intact, no edema. [] 


Neurologic: Alert and oriented X 3, normal motor function, normal sensory 

function, no focal deficits noted. []


Psychologic: Affect normal, judgement normal, mood normal. []





Current Patient Data:


Vital Signs:





                                   Vital Signs








  Date Time  Temp Pulse Resp B/P (MAP) Pulse Ox O2 Delivery O2 Flow Rate FiO2


 


11/26/21 07:01 96.3 70 18 136/80 (98) 98 Room Air  











EKG:


EKG:


[]





Radiology/Procedures:


Radiology/Procedures:


[]


Impressions:


Study: XR CHEST 1V





Indication: Cough.





Comparison: 6/11/2020





Findings:





Unchanged cardiomediastinal silhouette and alberta. Similar aeration pattern of the

 lungs. No confluent infiltrate, pleural effusion or pneumothorax.





Impression:





No acute radiographic abnormality of the chest. No relevant change from the 

6/11/2020 comparison. 





Electronically signed by: SANJAY HUERTAS MD (11/26/2021 7:43 AM) [id number]














DICTATED AND SIGNED BY:     SANJAY HUERTAS MD


DATE:     11/26/21 0741





CC: NANCY TEMPLETON DO; FAY BLANCAS DO ~MTH0 0





Heart Score:


C/O Chest Pain:  N/A


Risk Factors:


Risk Factors:  DM, Current or recent (<one month) smoker, HTN, HLP, family 

history of CAD, obesity.


Risk Scores:


Score 0 - 3:  2.5% MACE over next 6 weeks - Discharge Home


Score 4 - 6:  20.3% MACE over next 6 weeks - Admit for Clinical Observation


Score 7 - 10:  72.7% MACE over next 6 weeks - Early Invasive Strategies





Course & Med Decision Making:


Course & Med Decision Making


Pertinent Labs and Imaging studies reviewed. (See chart for details)


The patient's chest x-ray is negative for acute findings.  This appears to be a 

viral URI with cough.  It will just have to run its course.  The patient is 

stable for discharge at this time.


[]





Dragon Disclaimer:


Dragon Disclaimer:


This electronic medical record was generated, in whole or in part, using a voice

 recognition dictation system.





Departure


Departure:


Impression:  


   Primary Impression:  


   Viral URI with cough


Disposition:  01 HOME / SELF CARE / HOMELESS


Condition:  STABLE


Referrals:  


FAY BLANCAS DO (PCP)


Patient Instructions:  Upper Respiratory Infection, Adult, Easy-to-Read











NANCY TEMPLETON DO                 Nov 26, 2021 07:17

## 2023-01-10 NOTE — PDOC1
Danish speaking patient arrives ambulatory with daughter for complaints of epigastric pain x1 week - states they have tried tums with minimal relief   Also complaining of cough  No PMHX   History and Physical


Date of Admission


Date of Admission


DATE: 20 


TIME: 09:27





Identification/Chief Complaint


Chief Complaint


Palpitations





Source


Source:  Patient





History of Present Illness


History of Present Illness


Ms Patel is a 46 yo F w/ PMHx smoker, WPW s/p ablation (6yrs ago) who 

presents to [facility] on 2020 for palpitations that started about 90 minutes

prior to ED arrival.  Patient has a history of prior supra ventricular 

tachycardia as well as Darci-Parkinson-White syndrome.  Patient has had a prior 

cardiac ablation in the past at [facility] and used to follow up there for 

Cardiology.


EKG showed SVT rate 160s otherwise no other obvious acute changes, incomplete 

bundle branch block, abnormal EKG. CXR no acute abnormalities


She was given a dose of adenosine 6 mg briefly converted heart to a sinus rhythm

but it did not stay more than a minute.  Patient was then given adenosine 12 mg 

IV push again the rhythm changed for less than 1 minute then converted back to 

SVT.


Cardiology was contacted, and they recommended to start a diltiazem infusion.  

She was transferred to [place] in stable condition and converted to sinus 

rhythm around 0200.


WBC 10.3, Hb 14.8, platelets 283, Na 146, K 3.9, BUN 6, Cr 0.7, AST 45, ALT 53, 

Trop <0.017





Seen bedside [facility] she continues on diltiazem infusion.  She

still admits her history by saying that she has been tried on metoprolol 25 mg 

twice daily in the past and had minimal success and had continued SVT despite 

that and approximately 6 years ago she had an ablation at [facility] that was

successful and has not followed up for over a year.  She continues to smoke and 

drink, however she avoids other stimulants such as caffeine and cheese.  She 

notes that for the past 3 days she is actually felt badly and has a lot of 

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  She 

has never been tried on flecainide or Rythmol.





Patient is otherwise stable with no other complaints





Past Medical History


Cardiovascular:  HTN, Other (WPW)





Past Surgical History


Past Surgical History:  





Family History


Family History:  Hypertension





Social History


Smoke:  1 pack per day


ALCOHOL:  social


Drugs:  None





Current Medications


Current Medications





Current Medications


Diltiazem HCl 125 mg/Sodium Chloride 125 ml @ 5 mls/hr CONT  PRN IV SEE I/O 

RECORD Last administered on 20at 04:44;  Start 20 at 01:00


Citalopram Hydrobromide (CeleXA) 20 mg DAILY PO  Last administered on 20at 

08:53;  Start 20 at 09:00


Pantoprazole Sodium (Protonix) 40 mg DAILYAC PO  Last administered on 20at 

08:53;  Start 20 at 07:30





Active Scripts


Active


Reported


Omeprazole 20 Mg Capsule.dr 1 Cap PO DAILY


Celexa (Citalopram Hydrobromide) 20 Mg Tablet 1 Tab PO DAILY





Allergies


Allergies:  


Coded Allergies:  


     codeine (Verified  Allergy, Intermediate, Rash, 4/9/15)





ROS


General:  No: Chills, Night Sweats, Fatigue, Malaise, Appetite, Other


PSYCHOLOGICAL ROS:  No: Anxiety, Behavioral Disorder, Concentration difficultie,

Decreased libido, Depression, Disorientation, Hallucinations, Hostility, 

Irritablity, Memory difficulties, Mood Swings, Obsessive thoughts, Physical 

abuse, Sexual abuse, Sleep disturbances, Suicidal ideation, Other


Eyes:  No Blurry vision, No Decreased vision, No Double vision, No Dry eyes, No 

Excessive tearing, No Eye Pain, No Itchy Eyes, No Loss of vision, No 

Photophobia, No Scotomata, No Uses contacts, No Uses glasses, No Other


HEENT:  No: Heacaches, Visual Changes, Hearing change, Nasal congestion, Nasal 

discharge, Oral lesions, Sinus pain, Sore Throat, Epistaxis, Sneezing, Snoring, 

Tinnitus, Vertigo, Vocal changes, Other


ALLERGY AND IMMUNOLOGY:  No: Hives, Insect Bite Sensitivity, Itchy/Watery Eyes, 

Nasal Congestion, Post Nasal Drip, Seasonal Allergies, Other


Hematological and Lymphatic:  No: Bleeding Problems, Blood Clots, Blood Transfus

ions, Brusing, Night Sweats, Pallor, Swollen Lymph Nodes, Other


ENDOCRINE:  No: Breast Changes, Galactorrhea, Hair Pattern Changes, Hot Flashes,

Malaise/lethargy, Mood Swings, Palpitations, Polydipsia/polyuria, Skin Changes, 

Temperature Intolerance, Unexpected Weight Changes, Other


Breast:  No New/Changing Breast Lumps, No Nipple changes, No Nipple discharge, 

No Other


Respiratory:  No: Cough, Hemoptysis, Orthopnea, Pleuritic Pain, Shortness of b

reath, SOB with excertion, Sputum Changes, Stridor, Tachypnea, Wheezing, Other


Cardiovascular:  No Chest Pain, No Palpitations, No Orthopnea, No Paroxysmal 

Noc. Dyspnea, No Edema, No Lt Headedness, No Other


Gastrointestinal:  No Nausea, No Vomiting, No Abdominal Pain, No Diarrhea, No 

Constipation, No Melena, No Hematochezia, No Other


Genitourinary:  No Dysuria, No Frequency, No Incontinence, No Hematuria, No 

Retention, No Discharge, No Urgency, No Pain, No Flank Pain, No Other, No , No ,

No , No , No , No , No 


Musculoskeletal:  No Gait Disturbance, No Joint Pain, No Joint Stiffness, No 

Joint Swelling, No Muscle Pain, No Muscular Weakness, No Pain In:, No Swelling 

In:, No Other


Neurological:  No Behavorial Changes, No Bowel/Bladder ControlChng, No 

Confusion, No Dizziness, No Gait Disturbance, No Headaches, No Impaired 

Coord/balance, No Memory Loss, No Numbness/Tingling, No Seizures, No Speech 

Problems, No Tremors, No Visual Changes, No Weakness, No Other


Skin:  No Dry Skin, No Eczema, No Hair Changes, No Lumps, No Mole Changes, No 

Mottling, No Nail Changes, No Pruritus, No Rash, No Skin Lesion Changes, No 

Other, No Acne





Physical Exam


General:  Alert, Oriented X3, Cooperative, No acute distress


HEENT:  Atraumatic, PERRLA, EOMI, Mucous membr. moist/pink


Lungs:  Clear to auscultation, Normal air movement


Heart:  S1S2, RRR, no thrills, no rubs, no gallops, no murmurs


Abdomen:  Normal bowel sounds, Soft, No tenderness, No hepatosplenomegaly, No 

masses


Rectal Exam:  not examined


Extremities:  No clubbing, No cyanosis, No edema, Normal pulses, No 

tenderness/swelling


Skin:  No rashes, No breakdown, No significant lesion


Neuro:  Normal gait, Normal speech, Strength at 5/5 X4 ext, Normal tone, 

Sensation intact, Cranial nerves 3-12 NL, Reflexes 2+


Psych/Mental Status:  Mental status NL, Mood NL





Vitals


Vitals





Vital Signs








  Date Time  Temp Pulse Resp B/P (MAP) Pulse Ox O2 Delivery O2 Flow Rate FiO2


 


20 06:54 98.4 83 16 113/64 (80) 93 Room Air  





 98.4       











Images


Images


CXR:


Low lung volumes with bronchovascular crowding. The cardiomediastinal silhouette

and alberta are within normal limits for size given low lung volumes and AP 

technique. No lobar consolidation, pleural effusion or pneumothorax.


 


Impression:


No acute radiographic abnormality of the chest. Low lung volumes.





VTE Prophylaxis Ordered


VTE Prophylaxis Devices:  No


VTE Pharmacological Prophylaxi:  Yes





Assessment/Plan


Assessment/Plan


A/P:


SVT (supraventricular tachycardia) -not surprising adenosine did not work, it is

good that calcium channel blocker worked.  Given her history of metoprolol will 

give 1 dose of flecainide wait 1 hour and DC diltiazem.   mg, cardiology 

consulted


History of Darci-Parkinson-White (WPW) syndrome -status post ablation. 

Cardiology consulted


Smoker - counseled on cessation


ETOH use - counseled on cessation


Morbid obesitycounseled on weight loss.





FEN - Cardiac diet


PPX - lovenox


FULL CODE


Dispo - inpatient for afib with RVR.





Justicifation of Admission Dx:


Justifications for Admission:


Justification of Admission Dx:  Yes











CARLENE MAXWELL MD        2020 09:28